# Patient Record
Sex: MALE | Race: WHITE | Employment: FULL TIME | URBAN - METROPOLITAN AREA
[De-identification: names, ages, dates, MRNs, and addresses within clinical notes are randomized per-mention and may not be internally consistent; named-entity substitution may affect disease eponyms.]

---

## 2017-04-14 ENCOUNTER — LAB ONLY (OUTPATIENT)
Dept: FAMILY MEDICINE CLINIC | Age: 27
End: 2017-04-14

## 2017-04-14 DIAGNOSIS — Z01.89 ROUTINE LAB DRAW: Primary | ICD-10-CM

## 2017-04-15 LAB
CHOLEST SERPL-MCNC: 173 MG/DL (ref 100–199)
HDLC SERPL-MCNC: 34 MG/DL
LDLC SERPL CALC-MCNC: 108 MG/DL (ref 0–99)
TRIGL SERPL-MCNC: 157 MG/DL (ref 0–149)
VLDLC SERPL CALC-MCNC: 31 MG/DL (ref 5–40)

## 2017-04-20 ENCOUNTER — OFFICE VISIT (OUTPATIENT)
Dept: FAMILY MEDICINE CLINIC | Age: 27
End: 2017-04-20

## 2017-04-20 VITALS
SYSTOLIC BLOOD PRESSURE: 123 MMHG | BODY MASS INDEX: 31.81 KG/M2 | DIASTOLIC BLOOD PRESSURE: 89 MMHG | HEART RATE: 79 BPM | OXYGEN SATURATION: 99 % | WEIGHT: 240 LBS | HEIGHT: 73 IN | RESPIRATION RATE: 16 BRPM | TEMPERATURE: 97.3 F

## 2017-04-20 DIAGNOSIS — R79.89 ELEVATED LFTS: ICD-10-CM

## 2017-04-20 DIAGNOSIS — E78.5 HYPERLIPIDEMIA, UNSPECIFIED HYPERLIPIDEMIA TYPE: Primary | ICD-10-CM

## 2017-04-20 DIAGNOSIS — R73.09 ELEVATED HEMOGLOBIN A1C: ICD-10-CM

## 2017-04-20 NOTE — PROGRESS NOTES
HISTORY OF PRESENT ILLNESS  Zelda Holt is a 27 y.o. male. HPI Comments: Patient is here to follow up on his cholesterol and his recent cholesterol panel shows a drastic improvement with slightly elevated triglycerides at 157 and LDL at 108. I have compared his current results with his last cholesterol panel from 2015. Patient is also due to have his liver functions and hemoglobin a1c  Repeated today. He is feeling better and he has changed his diet around drastically with more healthier meats and vegetables. He has also started to exercise. Cholesterol Problem   The history is provided by the patient. This is a new problem. The problem occurs constantly. The problem has been gradually improving. Pertinent negatives include no chest pain, no abdominal pain, no headaches and no shortness of breath. The symptoms are aggravated by stress and eating. Relieved by: diet change. Treatments tried: diet. The treatment provided mild relief. Review of Systems   Constitutional: Negative for chills, diaphoresis, fever and malaise/fatigue. HENT: Negative for congestion, nosebleeds and sore throat. Eyes: Negative for blurred vision, double vision, photophobia and pain. Respiratory: Negative for cough, sputum production, shortness of breath and wheezing. Cardiovascular: Negative for chest pain, palpitations and leg swelling. Gastrointestinal: Negative for abdominal pain, blood in stool, constipation and diarrhea. Genitourinary: Negative for dysuria and urgency. Musculoskeletal: Negative for falls, joint pain and myalgias. Neurological: Negative for dizziness, tingling, focal weakness, weakness and headaches. Psychiatric/Behavioral: Negative for depression and memory loss. The patient is not nervous/anxious.       Visit Vitals    /89 (BP 1 Location: Left arm, BP Patient Position: Sitting)    Pulse 79    Temp 97.3 °F (36.3 °C) (Oral)    Resp 16    Ht 6' 1\" (1.854 m)    Wt 240 lb (108.9 kg)  SpO2 99%    BMI 31.66 kg/m2       Physical Exam   Constitutional: He is oriented to person, place, and time. He appears well-developed and well-nourished. No distress. HENT:   Head: Normocephalic and atraumatic. Right Ear: External ear normal.   Left Ear: External ear normal.   Mouth/Throat: Oropharynx is clear and moist. No oropharyngeal exudate. Eyes: EOM are normal. Pupils are equal, round, and reactive to light. No scleral icterus. Neck: Normal range of motion. No thyromegaly present. Cardiovascular: Normal rate and normal heart sounds. Pulmonary/Chest: Effort normal and breath sounds normal. No respiratory distress. He has no wheezes. Abdominal: Soft. Bowel sounds are normal. He exhibits no distension. There is no tenderness. Lymphadenopathy:     He has no cervical adenopathy. Neurological: He is alert and oriented to person, place, and time. Psychiatric: He has a normal mood and affect. ASSESSMENT and PLAN  Hyperlipidemia :  1) Goal LDL less than or equal to 100 mg/dl , total cholesterol less than or equal to 200 mg/dl. 2) Goal blood pressure less than or equal to 140/90 mmHg. 3) Discussed low - cholesterol and low fat diet, Good Fats vs Bad Fats. 4) Exercise 30 min 3-5 times a week for goal BMI of less than or equal to 25.  5) Continue current medication as prescribed for cholesterol control. 6) Explained the side effects of medication and patient verbalized understanding. 7) You may also begin fish oil 1000 mg 3-4 times a day for better cholesterol control. 8) You may also try red yeast rice for cholesterol control. 9) Please drink skim milk if you drink dairy products. 10) Please avoid smoking , alcohol , or any illicit drug use if applicable to you. 11) Regular cholesterol panel to be done every 6-12 months.  12) Discussed attributing risk factors , patient verbalized understanding.

## 2017-04-20 NOTE — PROGRESS NOTES
Patient is here for f/u for cholesterol patient states no issues. 1. Have you been to the ER, urgent care clinic since your last visit? Hospitalized since your last visit?no    2. Have you seen or consulted any other health care providers outside of the [address] [place] [address] since your last visit? Include any pap smears or colon screening.  no

## 2017-04-21 LAB
ALBUMIN SERPL-MCNC: 4.9 G/DL (ref 3.5–5.5)
ALBUMIN/GLOB SERPL: 2 {RATIO} (ref 1.2–2.2)
ALP SERPL-CCNC: 85 IU/L (ref 39–117)
ALT SERPL-CCNC: 32 IU/L (ref 0–44)
AST SERPL-CCNC: 31 IU/L (ref 0–40)
BILIRUB SERPL-MCNC: 0.4 MG/DL (ref 0–1.2)
BUN SERPL-MCNC: 9 MG/DL (ref 6–20)
BUN/CREAT SERPL: 10 (ref 9–20)
CALCIUM SERPL-MCNC: 9.4 MG/DL (ref 8.7–10.2)
CHLORIDE SERPL-SCNC: 97 MMOL/L (ref 96–106)
CO2 SERPL-SCNC: 25 MMOL/L (ref 18–29)
CREAT SERPL-MCNC: 0.92 MG/DL (ref 0.76–1.27)
EST. AVERAGE GLUCOSE BLD GHB EST-MCNC: 114 MG/DL
GLOBULIN SER CALC-MCNC: 2.4 G/DL (ref 1.5–4.5)
GLUCOSE SERPL-MCNC: 100 MG/DL (ref 65–99)
HBA1C MFR BLD: 5.6 % (ref 4.8–5.6)
POTASSIUM SERPL-SCNC: 4.1 MMOL/L (ref 3.5–5.2)
PROT SERPL-MCNC: 7.3 G/DL (ref 6–8.5)
SODIUM SERPL-SCNC: 139 MMOL/L (ref 134–144)

## 2017-05-16 ENCOUNTER — OFFICE VISIT (OUTPATIENT)
Dept: FAMILY MEDICINE CLINIC | Age: 27
End: 2017-05-16

## 2017-05-16 VITALS
SYSTOLIC BLOOD PRESSURE: 119 MMHG | OXYGEN SATURATION: 97 % | RESPIRATION RATE: 17 BRPM | BODY MASS INDEX: 31.54 KG/M2 | WEIGHT: 238 LBS | HEART RATE: 72 BPM | DIASTOLIC BLOOD PRESSURE: 79 MMHG | HEIGHT: 73 IN | TEMPERATURE: 96.9 F

## 2017-05-16 DIAGNOSIS — J01.10 ACUTE FRONTAL SINUSITIS, RECURRENCE NOT SPECIFIED: Primary | ICD-10-CM

## 2017-05-16 DIAGNOSIS — H61.23 BILATERAL IMPACTED CERUMEN: ICD-10-CM

## 2017-05-16 RX ORDER — AZITHROMYCIN 250 MG/1
TABLET, FILM COATED ORAL
Qty: 6 TAB | Refills: 0 | Status: SHIPPED | OUTPATIENT
Start: 2017-05-16 | End: 2017-05-21

## 2017-05-16 NOTE — PROGRESS NOTES
HISTORY OF PRESENT ILLNESS  Caty Epps is a 27 y.o. male. Sinus Pain    The history is provided by the patient. This is a new problem. The current episode started yesterday. The problem has been gradually worsening. There has been no fever. The pain is at a severity of 5/10. The pain is moderate. The pain has been constant since onset. Associated symptoms include chills, congestion, ear pain, sinus pressure, sore throat, rhinorrhea and headaches. Pertinent negatives include no hoarse voice, no cough, no shortness of breath and no chest pain. Treatments tried: generic cold medicine        Review of Systems   Constitutional: Positive for chills. Negative for fever and malaise/fatigue. HENT: Positive for congestion, ear pain, rhinorrhea, sinus pressure and sore throat. Negative for hoarse voice. Eyes: Negative for blurred vision, double vision and discharge. Respiratory: Negative for cough, sputum production, shortness of breath and wheezing. Cardiovascular: Negative for chest pain, palpitations and leg swelling. Gastrointestinal: Negative for abdominal pain, diarrhea, nausea and vomiting. Genitourinary: Negative for dysuria and frequency. Musculoskeletal: Negative for joint pain and myalgias. Skin: Negative for rash. Neurological: Positive for headaches. Negative for dizziness, tingling, focal weakness and weakness. Psychiatric/Behavioral: Negative for depression. The patient is not nervous/anxious. Visit Vitals    /79 (BP 1 Location: Left arm, BP Patient Position: Sitting)    Pulse 72    Temp 96.9 °F (36.1 °C) (Oral)    Resp 17    Ht 6' 1\" (1.854 m)    Wt 238 lb (108 kg)    SpO2 97%    BMI 31.4 kg/m2       Physical Exam   Constitutional: He is oriented to person, place, and time. He appears well-developed and well-nourished. No distress. HENT:   Head: Normocephalic and atraumatic.    Right Ear: External ear normal.   Left Ear: External ear normal.   Nose: Right sinus exhibits maxillary sinus tenderness and frontal sinus tenderness. Left sinus exhibits maxillary sinus tenderness and frontal sinus tenderness. Bilateral cerumen impaction    Neck: Normal range of motion. No thyromegaly present. Cardiovascular: Normal rate, regular rhythm and normal heart sounds. Pulmonary/Chest: Effort normal and breath sounds normal. No respiratory distress. He has no wheezes. Abdominal: Soft. Bowel sounds are normal.   Lymphadenopathy:     He has no cervical adenopathy. Neurological: He is alert and oriented to person, place, and time. Psychiatric: He has a normal mood and affect. ASSESSMENT and PLAN  Sinusitis :  1) Ok to take tylenol / motrin to relieve pain. 2) Please finish course of antibiotics , explained side effects and patient verbalizes understanding. 3) Mucolytic's such as guaifenesin which can thin out the mucous. 4) Use nasal steroid inhaler and anti-allergy medication. 5) Can use nasal saline spray or Neti-pot. 6) Please keep a humidifier in your bedroom. 7) Patient instructions and information on diagnosis to be handed out. Bilateral cerumen impaction :  - Please avoid using cotton swabs in both ears  - Routine cleaning with a MD every 6-12 months  - Please wear a shower cap to avoid excessive water in the ears  - Ears to be cleaned out today in the office   - Ok to use debrox solution to soften up wax.

## 2017-05-16 NOTE — PROGRESS NOTES
Rodrick Garcia is a 27 y.o. male presents to office for sore throat. Facial pressure since last night.       1. Have you been to the ER, urgent care clinic or hospitalized since your last visit? no        Health Maintenance items with a due date reviewed with patient:  Health Maintenance Due   Topic Date Due    Pneumococcal 19-64 Medium Risk (1 of 1 - PPSV23) 08/23/2011    DTaP/Tdap/Td series (1 - Tdap) 08/23/2013

## 2022-08-08 ENCOUNTER — NEW PATIENT (OUTPATIENT)
Dept: URBAN - METROPOLITAN AREA CLINIC 1 | Facility: CLINIC | Age: 32
End: 2022-08-08

## 2022-08-08 DIAGNOSIS — H52.13: ICD-10-CM

## 2022-08-08 DIAGNOSIS — H52.223: ICD-10-CM

## 2022-08-08 DIAGNOSIS — Z01.00: ICD-10-CM

## 2022-08-08 PROCEDURE — 92004 COMPRE OPH EXAM NEW PT 1/>: CPT

## 2022-08-08 ASSESSMENT — VISUAL ACUITY
OD_CC: 20/20-1
OS_CC: 20/20

## 2022-08-08 ASSESSMENT — TONOMETRY
OD_IOP_MMHG: 17
OS_IOP_MMHG: 17